# Patient Record
Sex: FEMALE | Race: WHITE | Employment: OTHER | URBAN - METROPOLITAN AREA
[De-identification: names, ages, dates, MRNs, and addresses within clinical notes are randomized per-mention and may not be internally consistent; named-entity substitution may affect disease eponyms.]

---

## 2017-07-28 ENCOUNTER — HOSPITAL ENCOUNTER (EMERGENCY)
Age: 82
Discharge: HOME | End: 2017-07-28
Attending: EMERGENCY MEDICINE
Payer: MEDICARE

## 2017-07-28 ENCOUNTER — APPOINTMENT (OUTPATIENT)
Dept: GENERAL RADIOLOGY | Age: 82
End: 2017-07-28
Attending: EMERGENCY MEDICINE
Payer: MEDICARE

## 2017-07-28 VITALS
RESPIRATION RATE: 18 BRPM | HEIGHT: 61 IN | TEMPERATURE: 98.2 F | SYSTOLIC BLOOD PRESSURE: 117 MMHG | OXYGEN SATURATION: 98 % | BODY MASS INDEX: 18.5 KG/M2 | WEIGHT: 98 LBS | DIASTOLIC BLOOD PRESSURE: 64 MMHG | HEART RATE: 74 BPM

## 2017-07-28 DIAGNOSIS — R07.89 CHEST WALL PAIN: Primary | ICD-10-CM

## 2017-07-28 DIAGNOSIS — R58 ECCHYMOSIS: ICD-10-CM

## 2017-07-28 LAB
ATRIAL RATE: 64 BPM
CALCULATED P AXIS, ECG09: 92 DEGREES
CALCULATED R AXIS, ECG10: -22 DEGREES
CALCULATED T AXIS, ECG11: -46 DEGREES
DIAGNOSIS, 93000: NORMAL
P-R INTERVAL, ECG05: 214 MS
Q-T INTERVAL, ECG07: 398 MS
QRS DURATION, ECG06: 74 MS
QTC CALCULATION (BEZET), ECG08: 410 MS
VENTRICULAR RATE, ECG03: 64 BPM

## 2017-07-28 PROCEDURE — 73060 X-RAY EXAM OF HUMERUS: CPT

## 2017-07-28 PROCEDURE — 93005 ELECTROCARDIOGRAM TRACING: CPT | Performed by: EMERGENCY MEDICINE

## 2017-07-28 PROCEDURE — 73030 X-RAY EXAM OF SHOULDER: CPT

## 2017-07-28 PROCEDURE — 99285 EMERGENCY DEPT VISIT HI MDM: CPT | Performed by: EMERGENCY MEDICINE

## 2017-07-28 NOTE — ED PROVIDER NOTES
HPI     CDNotes Templates                            Emergency Department     Chief Complaint:  Chest pain and right arm pain  HPI:  49-year-old female presents to the emergency department from [facility]. This morning she had ain in her chest.  Difficulty swallowing. She has a bruise on her right arm. She thinks she had a fall on Sunday night. Had been moving her arm well since then. Family thinks the bruise is from where she had blood drawn. cchest wall pain this morning  Severity of symptoms are described as mild    Historian:   patient  Review of Systems:  Include pertinent positives and negatives. CONST:  Denies: fever, chills, weakness  ENT:  Denies: sore throat, earache, nasal congestion  EYES:  Denies: vision changes, double vision, discharge  CARDIO: Denies: chest pain, palpitations   RESP:  Denies: cough, shortness of breath, dyspnea on exertion  GI:  Denies: abdominal pain, nausea, vomiting, diarrhea, melena,      hematochezia  :  Denies: dysuria, hematuria, urinary frequency  MUSC: Denies: muscle aches, joint pain  SKIN:  Denies: hives, rash  PSYCH: Denies: anxiety, depression  ENDO: Denies: polyuria, polydipsia  Heme/Lymph: Denies: easy bruising, bleeding  NEURO: Denies: headache, confusion, change in behavior, extremity weakness,      paresthesias  Past Medical History:  Past Medical History:   Diagnosis Date    Arthritis     osteoarthritis    Asthma     history of asthma.   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     Past Surgical History: Procedure Laterality Date    BREAST SURGERY PROCEDURE UNLISTED  1995    lumpectomy, right breast    CHEST SURGERY PROCEDURE UNLISTED      back 1999    HX CATARACT REMOVAL  2010    bilateral with IOL implant    HX GYN      R breast lumpectomy, L breast lumpectomies-benign    HX HEENT  1983    right parotid gland     Social History   Substance Use Topics    Smoking status: Never Smoker    Smokeless tobacco: Never Used    Alcohol use No     Family History   Problem Relation Age of Onset    Tuberculosis Father     Cancer Brother     Cancer Brother      Previous Medications    ACETAMINOPHEN (TYLENOL) 325 MG TABLET    Take 325 mg by mouth every four (4) hours as needed. CALCIUM PO    Take  by mouth daily. Last dose was 10-23-11     CELECOXIB (CELEBREX) 200 MG CAPSULE    Take 200 mg by mouth as needed. Holding for surgery. Last dose was 10-17-11. CYANOCOBALAMIN (VITAMIN B-12) 1,000 MCG TABLET    Take 1,000 mcg by mouth daily. Holding for surgery. Last dose was 10-24-11    LATANOPROST (XALATAN) 0.005 % OPHTHALMIC SOLUTION    Administer 1 Drop to both eyes nightly. LEVOTHYROXINE (SYNTHROID) 25 MCG TABLET    Take  by mouth Daily (before breakfast). OLOPATADINE (PATANOL) 0.1 % OPHTHALMIC SOLUTION    Administer 1 Drop to both eyes as needed. OMEGA-3 FATTY ACIDS (OMEGA 3 PO)    Take  by mouth daily. Holding for surgery. Last dose was 10-23-11`     OMEPRAZOLE (PRILOSEC) 40 MG CAPSULE    Take 20 mg by mouth every morning. OTHER    as directed. Juice plus (fruit, vegetables, vitamins)  Takes 2 capsules twice a day. Holding for surgery. Last dose was 10-23-11    TIMOLOL (BETIMOL) 0.5 % OPHTHALMIC SOLUTION    Administer 1 Drop to both eyes daily.  use in affected eye(s)      Allergies as of 07/28/2017 - Review Complete 07/28/2017   Allergen Reaction Noted    Chocolate flavor Unknown (comments) 09/14/2011    Codeine Unknown (comments) 09/14/2011    Morphine Unknown (comments) 10/31/2011    Other medication Unknown (comments) 09/14/2011    Pcn [penicillins] Unknown (comments) 09/14/2011    Shellfish containing products Unknown (comments) 09/14/2011       Physical Exam:    Vital signs:   Visit Vitals    /68 (BP 1 Location: Right arm, BP Patient Position: At rest)    Pulse 73    Temp 97.9 °F (36.6 °C)    Resp 18    Ht 5' 1\" (1.549 m)    Wt 44.5 kg (98 lb)    SpO2 98%    BMI 18.52 kg/m2       Vital signs were reviewed. Pulse oximetry interpretation: 98%, normal    General Appear: well appearing  Head:   atraumatic  Ears/Nose/Throat: throat clear, TMs clear bilaterally  Eyes:   PERRL, EOMI, anicteric  Neck:   supple, FROM, no lymphadenopathy, no meningismus  Cardiovascular: regular rate and rhythm, no murmur  Respiratory:  clear to auscultation with no wheezes, rales, ronchi  Back:    FROM, no CVA tenderness  Abdomen:  soft, non-tender, no guarding/rebound, normo-active bowel       sounds   Rectal: good tone, heme negative  Musculoskeletal: Ecchymosis at the right before meals. There is no swelling tenderness or deformity. Full range of motion of the right shoulder and right elbow right wrist.  Skin:   dry, no rashes  Neurologic:  alert, oriented, CN II-XII intact, DTRs equal bilaterally     _______________________________________________________________________    LABS/RADIOLOGY/EKG:    Radiology studies performed:    Xr Shoulder Rt Ap/lat Min 2 V    Result Date: 7/28/2017  Right shoulder, 3 views, 7/28/2017 INDICATION: Moderate right shoulder and upper arm pain from a fall this morning COMPARISON: None. There is no fracture or dislocation. Surgical staples noted right axilla. Mild acromioclavicular joint degenerative change. IMPRESSION: No acute finding. Xr Humerus Rt    Result Date: 7/28/2017  Right humerus, 2 views, 7/28/2017 INDICATION: Moderate upper arm pain following a fall 2 views of the humerus obtained. There is no fracture or dislocation. Surgical staples noted right axilla. IMPRESSION: No acute finding. EKG interpretation:    No acute changes    ________________________________________________________________________  Progress:    atient resting comfortably. Appears well. Difficulty swallowing is old for her. Bruise on her right arm is likely secondary to blood draw. Deformities noted. Full range of motion. Patient is chest pain-free.   EKG is normal.  We'll discharge    Nursing notes were reviewed: yes  Old medical records: obtained and reviewed:  yes  Discussed patient with another provider: no  _______________________________________________________________________  Condition:  improved  Disposition:  home  Diagnosis:  Chest wall pain, ecchymosis      Teresa Calderon M.D.      [employer] All Rights Reserved; version 2.0; revised April, 2016      Review of Systems    Vitals:    07/28/17 0615   BP: 137/68   Pulse: 73   Resp: 18   Temp: 97.9 °F (36.6 °C)   SpO2: 98%   Weight: 44.5 kg (98 lb)   Height: 5' 1\" (1.549 m)            Physical Exam     [facility]  ED Course       Procedures

## 2017-07-28 NOTE — DISCHARGE INSTRUCTIONS
Follow-up with your doctor as needed. Small frequent meals. -- lots of fluids    Arm xrays were normal -- no broken bones  Warm compress to bruise    Heart tracing was normal

## 2017-07-28 NOTE — ED TRIAGE NOTES
PT arrived to ED c/o difficulty swallowing for the past several years. Pt also c/o right arm pain from a fall that happened on [place] night.

## 2017-07-28 NOTE — ED NOTES
Discharge instructions provided and explained to the pt and daughter. Opportunity for questions provided.